# Patient Record
Sex: MALE | URBAN - METROPOLITAN AREA
[De-identification: names, ages, dates, MRNs, and addresses within clinical notes are randomized per-mention and may not be internally consistent; named-entity substitution may affect disease eponyms.]

---

## 2023-06-26 ENCOUNTER — APPOINTMENT (RX ONLY)
Dept: URBAN - METROPOLITAN AREA CLINIC 114 | Facility: CLINIC | Age: 33
Setting detail: DERMATOLOGY
End: 2023-06-26

## 2023-06-26 DIAGNOSIS — L90.5 SCAR CONDITIONS AND FIBROSIS OF SKIN: ICD-10-CM

## 2023-06-26 PROCEDURE — ? PRESCRIPTION

## 2023-06-26 PROCEDURE — ? COUNSELING

## 2023-06-26 PROCEDURE — ? ADDITIONAL NOTES

## 2023-06-26 PROCEDURE — 99202 OFFICE O/P NEW SF 15 MIN: CPT

## 2023-06-26 RX ORDER — TRETIONIN 1 MG/G
CREAM TOPICAL
Qty: 45 | Refills: 6 | Status: ERX | COMMUNITY
Start: 2023-06-26

## 2023-06-26 RX ADMIN — TRETIONIN: 1 CREAM TOPICAL at 00:00

## 2023-06-26 ASSESSMENT — LOCATION DETAILED DESCRIPTION DERM: LOCATION DETAILED: LEFT RIB CAGE

## 2023-06-26 ASSESSMENT — LOCATION ZONE DERM: LOCATION ZONE: TRUNK

## 2023-06-26 ASSESSMENT — LOCATION SIMPLE DESCRIPTION DERM: LOCATION SIMPLE: ABDOMEN

## 2023-06-26 NOTE — PROCEDURE: ADDITIONAL NOTES
Detail Level: Simple
Additional Notes: Reassurance, to patient. He will need to see a plastic surgeon. This is a big scar, since infancy. The texture, can be helped with Tretinoin cream. This can take up to one year, to notice a difference. \\nAlso concerned with the scar indention. Advised to do diet, and exercise. Suggest to see a plastic surgeon. They can help Liposuction vs submission to the scar site. Tummy tuck, is another option.\\nDiscussed C02 laser. Should use Hydroquinone prior to treatment to decrease risk of PIH.
Render Risk Assessment In Note?: no

## 2023-06-26 NOTE — HPI: SCAR
Is This A New Presentation, Or A Follow-Up?: Scar
How Severe Is Your Scar?: moderate
Additional History: \\n\\n\\n\\n\\nNew patient. [age] year old male. Here for first time. Has a scar on abdomen. Wants to know what he can do to reduce the scar. Present since two weeks old. No pain.